# Patient Record
Sex: MALE | Race: WHITE | NOT HISPANIC OR LATINO | URBAN - METROPOLITAN AREA
[De-identification: names, ages, dates, MRNs, and addresses within clinical notes are randomized per-mention and may not be internally consistent; named-entity substitution may affect disease eponyms.]

---

## 2023-10-26 ENCOUNTER — ANCILLARY PROCEDURE (OUTPATIENT)
Dept: RADIOLOGY | Facility: CLINIC | Age: 48
End: 2023-10-26
Payer: COMMERCIAL

## 2023-10-26 ENCOUNTER — OFFICE VISIT (OUTPATIENT)
Dept: ORTHOPEDIC SURGERY | Facility: CLINIC | Age: 48
End: 2023-10-26
Payer: COMMERCIAL

## 2023-10-26 VITALS — BODY MASS INDEX: 40.81 KG/M2 | WEIGHT: 260 LBS | HEIGHT: 67 IN

## 2023-10-26 DIAGNOSIS — M25.572 ACUTE LEFT ANKLE PAIN: ICD-10-CM

## 2023-10-26 DIAGNOSIS — S93.402S: Primary | ICD-10-CM

## 2023-10-26 PROCEDURE — 99203 OFFICE O/P NEW LOW 30 MIN: CPT

## 2023-10-26 PROCEDURE — 73610 X-RAY EXAM OF ANKLE: CPT | Mod: LT,FY

## 2023-10-26 PROCEDURE — 1036F TOBACCO NON-USER: CPT

## 2023-10-26 PROCEDURE — 73610 X-RAY EXAM OF ANKLE: CPT | Mod: LEFT SIDE | Performed by: RADIOLOGY

## 2023-10-26 RX ORDER — MELOXICAM 15 MG/1
15 TABLET ORAL DAILY PRN
Qty: 30 TABLET | Refills: 1 | Status: SHIPPED | OUTPATIENT
Start: 2023-10-26 | End: 2023-11-25

## 2023-10-27 NOTE — PROGRESS NOTES
Subjective    Patient ID: Serge Ennis is a 48 y.o. male.    Chief Complaint: Pain of the Left Ankle (STEPPED IN A SPRINKLER HOLE ONE MONTH AGO INJURING LEFT ANKLE./+PAIN AND SWELLING)    HPI  This is a pleasant 48-year-old male presenting to the office for evaluation of left ankle pain and swelling, which has been ongoing for approximately 4 weeks.  Patient explains that 1 month ago, he stepped into a sprinkler hole on a golf course with his left ankle, causing his ankle to invert.  He immediately noticed pain and swelling, but was able to complete his golf game.  2 weeks ago, he also took a work trip to [place], when he had to do excessive walking.  He began to notice increased swelling and pain with increased walking and activity.  He was able to find a walking boot that he used from previous Achilles tendon injury, and has been wearing that for approximately 2 days.  With the 2 days wearing the boot, he has noticed significant improvement in pain and swelling.  He points to the lateral aspect of his ankle when describing the pain.  Pain is described as a constant dull ache, sharp shooting at times.  He walks with a slight limp.  He has been taking Tylenol and ibuprofen with some relief of symptoms.  Denies numbness and paresthesia of left lower extremity.    The patient's past medical, surgical, family, and social history as well as allergies and medications were reviewed and updated in the chart.    Objective   Ortho Exam  Pleasant in no acute distress.  Walks in the office today with a mildly antalgic gait, use of walking boot.  Left lateral ankle appearing with soft tissue swelling.  There is no erythema or ecchymosis noted.  Skin is intact.  He is tender upon palpation of left lateral malleolus and surrounding ligaments.  Minimal tenderness to deltoid ligament.  He has slightly decreased range of motion of left foot and ankle in terms of inversion eversion dorsiflexion and plantarflexion as  compared to right foot and ankle.  He has adequate strength with resisted dorsiflexion and plantarflexion.  The ankle joint is stable, no ligament laxity.  Left lower extremity is well-perfused.  Sensation is intact to light touch.    Image Results:  Multiple view x-rays of the left foot and ankle obtained today personally reviewed, without evidence of acute fracture or dislocation.    Assessment/Plan   Encounter Diagnoses:  Mild sprain of left ankle, sequela    Acute left ankle pain    Plan: Discussion with patient about diagnosis of left ankle sprain with review of today's x-rays.  Explained that this type of injury can be treated conservatively, nonoperatively.  Conservative treatment options were discussed at length.  Advised patient to continue with walking boot over the next 1 to 2 weeks.  He was then fitted for a lace up ankle brace today, which he can transition into when he weans out of walking boot.  I have prescribed him meloxicam to help decrease pain inflammation.  He is aware not to take any other anti-inflammatories while on meloxicam, but can supplement with Tylenol.  He should avoid aggravating activities including prolonged walking standing and stair climbing.  I did make patient aware that even over the next 6 to 8 weeks, with increased activity standing and walking, he can experience increased swelling and pain.  If he does not see significant relief of symptoms in 4 to 6 weeks, he can follow-up as needed.    Orders Placed This Encounter    Ankle Brace, Lace Up or A60    XR ankle left 3+ views    meloxicam (Mobic) 15 mg tablet     No follow-ups on file.